# Patient Record
Sex: FEMALE | Race: WHITE | NOT HISPANIC OR LATINO | Employment: FULL TIME | URBAN - METROPOLITAN AREA
[De-identification: names, ages, dates, MRNs, and addresses within clinical notes are randomized per-mention and may not be internally consistent; named-entity substitution may affect disease eponyms.]

---

## 2017-01-25 ENCOUNTER — OFFICE VISIT (OUTPATIENT)
Dept: OBSTETRICS AND GYNECOLOGY | Facility: CLINIC | Age: 32
End: 2017-01-25

## 2017-01-25 VITALS
SYSTOLIC BLOOD PRESSURE: 118 MMHG | DIASTOLIC BLOOD PRESSURE: 70 MMHG | WEIGHT: 144 LBS | HEIGHT: 66 IN | BODY MASS INDEX: 23.14 KG/M2

## 2017-01-25 DIAGNOSIS — Z00.00 ANNUAL PHYSICAL EXAM: Primary | ICD-10-CM

## 2017-01-25 DIAGNOSIS — G47.00 INSOMNIA, UNSPECIFIED TYPE: ICD-10-CM

## 2017-01-25 PROBLEM — F41.9 ANXIETY: Status: ACTIVE | Noted: 2017-01-25

## 2017-01-25 PROCEDURE — 99395 PREV VISIT EST AGE 18-39: CPT | Performed by: OBSTETRICS & GYNECOLOGY

## 2017-01-25 NOTE — MR AVS SNAPSHOT
Jocy Kerline   1/25/2017 9:30 AM   Office Visit    Dept Phone:  [phone]   Encounter #:  [id number]    Provider:  Campbell Mahajan MD   Department:  [facility]                Your Full Care Plan              Today's Medication Changes          These changes are accurate as of: 1/25/17 10:52 AM.  If you have any questions, ask your nurse or doctor.               New Medication(s)Ordered:     Vortioxetine HBr 10 MG tablet   Commonly known as:  TRINTELLIX   Take 10 mg by mouth Daily for 30 days.   Started by:  Campbell Mahajan MD         Stop taking medication(s)listed here:     MULTIVITAMIN ADULT PO   Stopped by:  Campbell Mahajan MD                Where to Get Your Medications      These medications were sent to [address] AT [facility] [address] - [phone]  - [phone] [facility] [address] [facility] [address]     Phone:  [phone]     Vortioxetine HBr 10 MG tablet                  Your Updated Medication List          This list is accurate as of: 1/25/17 10:52 AM.  Always use your most recent med list.                FLUoxetine 20 MG capsule   Commonly known as:  PROZAC   Take 1 capsule by mouth Daily.       prenatal vitamin 27-0.8 27-0.8 MG tablet tablet       Vortioxetine HBr 10 MG tablet   Commonly known as:  TRINTELLIX   Take 10 mg by mouth Daily for 30 days.               You Were Diagnosed With        Codes Comments    Annual physical exam    -  Primary ICD-10-CM: Z00.00  ICD-9-CM: V70.0     Insomnia, unspecified type     ICD-10-CM: G47.00  ICD-9-CM: 780.52       Instructions     None    Patient Instructions History      Upcoming Appointments     Visit Type Date Time Department    ANNUAL 1/25/2017  9:30 AM [facility]      MyChart Signup     [facility] allows you to send messages to your doctor, view your test results, renew your prescriptions, schedule  "appointments, and more. To sign up, go to [email or web address] and click on the Sign Up Now link in the New User? box. Enter your Aidin Activation Code exactly as it appears below along with the last four digits of your Social Security Number and your Date of Birth () to complete the sign-up process. If you do not sign up before the expiration date, you must request a new code.    Aidin Activation Code: [email or web address]  Expires: 2017 10:52 AM    If you have questions, you can email [email or web address] or call [phone] to talk to our Aidin staff. Remember, Aidin is NOT to be used for urgent needs. For medical emergencies, dial 911.               Other Info from Your Visit           Allergies     Penicillins        Reason for Visit     Annual Exam           Vital Signs     Blood Pressure Height Weight Last Menstrual Period Body Mass Index Smoking Status    118/70 66\" (167.6 cm) 144 lb (65.3 kg) 2017 23.24 kg/m2 Never Smoker      Problems and Diagnoses Noted     Anxiety problem    Annual physical exam    -  Primary    Difficulty falling or staying asleep            "

## 2017-01-25 NOTE — PROGRESS NOTES
"Subjective   Chief Complaint   Patient presents with   • Annual Exam     Jocy Churchill is a 31 y.o. year old  presenting to be seen for her annual exam.    Current birth control method: none./rhythm.  She and her  who is 14 years older have not been really using anything for birth control.  She is under a lot of stress.  Family and work he has 9 children not all of whom they care for.  Her discussion regarding her exhalation is one that she is very stressed with not a lot of time and/or finances.  Doesn't sleep well associated with the above.  They also have recently had to pay $350 a month in child support that they don't really have.  It sounds like he is a  for a couple in [place].  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  She is on Prozac since last year or before when Dr. Donovan was in practice.  Doesn't sound like it's working very well also need to consider change.    Patient's last menstrual period was 2017.    She is sexually active.   Condoms are not typically used.  Decreased libido.    Past 6 month menstrual history:    Cycle Frequency: regular, predictable and consistent every 28 - 32 days   Menstrual cycle character: flow is normal   Cycle Duration: 5 - 6   Number of heavy days of flows: 0   Intermenstrual bleeding present: {no   Post-coital bleeding present: no     She exercises regularly: yes.1 child and 9 step children  Calcium intake: yes.  She performs self breast exam:no.  She has concerns about domestic violence: no.    The following portions of the patient's history were reviewed and updated as appropriate:problem list, current medications, allergies, past family history, past medical history, past social history and past surgical history.    Review of Systems increased stress - does meditation -helps      Objective   Visit Vitals   • /70   • Ht 66\" (167.6 cm)   • Wt 144 lb (65.3 kg)   • LMP 2017  Comment: 28 cycle " "days   • BMI 23.24 kg/m2       General:  well developed; well nourished  no acute distress   Skin:  No suspicious lesions seen   Thyroid: not examined   Breasts:   her breasts are symmetrical.  There are no masses with exception of the right axillary quadrant where there is about a 1+ centimeter mobile mass that she reports is unchanged since an ultrasound in 2015    Abdomen: soft, non-tender; no masses  no umbilical or inginual hernias are present  no hepato-splenomegaly   Pelvis: Clinical staff was present for exam  External genitalia:  normal appearance of the external genitalia including Bartholin's and [place]'s glands.  :  urethral meatus normal; urethral hypermobility is absent.  Vaginal:  normal pink mucosa without prolapse or lesions.  Cervix:  normal appearance. Pap done  Uterus:  normal size, shape and consistency.  Adnexa:  normal bimanual exam of the adnexa.  Rectal:  anus visually normal appearing.     Lab Review   No data reviewed    Imaging  Mammogram report       Assessment   1. Normal gynecologic examination patient is unsure whether she wants to have children or not.  Currently on prenatal vitamins and not using birth control other than the rhythm method.  2. Stress and anxiety associated with one child and 9 step children not all of whom they see every  weekend.  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 I suggested with her current stress stomach control with Prozac that we need to consider something different.  Her mother is on Celexa and she says her brother and father need to be on something.  We'll try TRintellix 10 mg.  3. She has insomnia.  Possibly due to stress and not being able to \"turnoff her mind \"     Plan   1 Trintellix 10 mg with card to cause number $10 per month     Continue prenatal vitamins and rhythm method for now discourage pregnancy until we get her stress under control     Increase exercise and calcium may help as well    Medications Rx this encounter:  No " orders of the defined types were placed in this encounter.         This note was electronically signed.    Campbell Mahajan MD  1/25/2017

## 2017-01-27 ENCOUNTER — TELEPHONE (OUTPATIENT)
Dept: OBSTETRICS AND GYNECOLOGY | Facility: CLINIC | Age: 32
End: 2017-01-27

## 2017-01-27 NOTE — TELEPHONE ENCOUNTER
----- Message from Jack Posey sent at 1/27/2017  8:53 AM EST -----  Contact: [phone]  PT RETURNED CALL    @9:34 left detailed message to see if patient has tried using a saving card for Trintellix. Her insurance will not cover Trintellix 10mg. Patient has tried and failed Prozac.    [phone] Patient called back and states she has not tried using the savings card. I advised patient to try using the savings card first and call back if she is still unable to afford it.

## 2017-03-17 ENCOUNTER — TELEPHONE (OUTPATIENT)
Dept: OBSTETRICS AND GYNECOLOGY | Facility: CLINIC | Age: 32
End: 2017-03-17

## 2017-03-17 RX ORDER — METRONIDAZOLE 500 MG/1
500 TABLET ORAL 2 TIMES DAILY
Qty: 10 TABLET | Refills: 0 | Status: SHIPPED | OUTPATIENT
Start: 2017-03-17 | End: 2017-03-22

## 2017-03-17 NOTE — TELEPHONE ENCOUNTER
Pt states she thinks that she has a BV infection. C/o vaginal odor with increase d/c. She states she changed soaps a couple days ago and she has had this happen before when she changes soaps. Would like to know if something could be called in for her?         [facility] in [place], [place]

## 2017-03-17 NOTE — TELEPHONE ENCOUNTER
It's doubtful that she gets BV by changing soap.  That sounds more of an allergic reaction.  However if she has a history of BV and has an odor I will e prescribe her a few days of Flagyl (done)

## 2017-03-17 NOTE — TELEPHONE ENCOUNTER
----- Message from Joan Rawls sent at 3/17/2017 10:02 AM EDT -----  Contact: [phone]  Called and ask for Sergei would like for her to please call her

## 2017-04-17 NOTE — TELEPHONE ENCOUNTER
----- Message from Joan Rawls sent at 4/17/2017 11:46 AM EDT -----  Pt needs nurse to call b/c meds trinelox is making her sick, wants something else prescribed.

## 2017-11-17 ENCOUNTER — TELEPHONE (OUTPATIENT)
Dept: OBSTETRICS AND GYNECOLOGY | Facility: CLINIC | Age: 32
End: 2017-11-17

## 2017-11-17 NOTE — TELEPHONE ENCOUNTER
I called Jocy to let her know that she would need to come in for an appointment before Flagyl could be prescribed.  No answer, but I left a voice mail asking her to return my call.

## 2017-11-20 RX ORDER — FLUCONAZOLE 150 MG/1
150 TABLET ORAL ONCE
Qty: 1 TABLET | Refills: 0 | Status: SHIPPED | OUTPATIENT
Start: 2017-11-20 | End: 2017-11-20

## 2017-11-20 NOTE — TELEPHONE ENCOUNTER
I called Jocy back today and her symptoms are consistent with a yeast infection, rather than BV.  External irritation and itching, very little discharge.  She has used OTC treatment for yeast with very little improvement.  I will send in one dose of Diflucan 150 mg to be taken orally.  She will also use 1% hydrocortisone cream externally to reduce inflammation.  She will call back if symptoms persist.

## 2018-02-12 ENCOUNTER — OFFICE VISIT (OUTPATIENT)
Dept: OBSTETRICS AND GYNECOLOGY | Facility: CLINIC | Age: 33
End: 2018-02-12

## 2018-02-12 VITALS
WEIGHT: 150 LBS | SYSTOLIC BLOOD PRESSURE: 114 MMHG | BODY MASS INDEX: 34.71 KG/M2 | DIASTOLIC BLOOD PRESSURE: 70 MMHG | HEIGHT: 55 IN

## 2018-02-12 DIAGNOSIS — N94.3 PMS (PREMENSTRUAL SYNDROME): ICD-10-CM

## 2018-02-12 DIAGNOSIS — Z01.419 WOMEN'S ANNUAL ROUTINE GYNECOLOGICAL EXAMINATION: Primary | ICD-10-CM

## 2018-02-12 PROCEDURE — 99395 PREV VISIT EST AGE 18-39: CPT | Performed by: OBSTETRICS & GYNECOLOGY

## 2018-02-12 RX ORDER — CITALOPRAM 20 MG/1
20 TABLET ORAL DAILY
Qty: 30 TABLET | Refills: 11 | Status: SHIPPED | OUTPATIENT
Start: 2018-02-12 | End: 2018-09-21 | Stop reason: SDUPTHER

## 2018-02-12 NOTE — PROGRESS NOTES
"Subjective   Chief Complaint   Patient presents with   • Annual Exam     Jocy Churchill is a 33 y.o. year old  presenting to be seen for her annual exam.    Current birth control method: none and rhythm method. She got nauseated and vomited 2 hours after Trintellix so she stopped. Her symptoms are worse PMS.  Patient's last menstrual period was 2018. 1 yeast infection last year and Rx helped. Hx cramps - uses heating pad and Tylenol    She is sexually active.   Condoms are not typically used.      Past 6 month menstrual history:    Cycle Frequency: regular, predictable and consistent every 28 - 32 days   Menstrual cycle character: flow is typically normal   Cycle Duration: 6 - 7   Number of heavy days of flows: 1 sticky clot for 1 day    Intermenstrual bleeding present: {no   Post-coital bleeding present: no     She exercises regularly: yes.  Calcium intake: 2 milk a day   She performs self breast exam:no.  She has concerns about domestic violence: no.    The following portions of the patient's history were reviewed and updated as appropriate:problem list, current medications, allergies, past family history, past medical history, past social history and past surgical history.    Review of Systems    normal bladder and bowels  Objective   /70  Ht 65.5 cm (25.79\")  Wt 68 kg (150 lb)  LMP 2018 Comment: 28 cycle days  .6 kg/m2     General:  well developed; well nourished  no acute distress  appears stated age   Skin:  No suspicious lesions seen   Thyroid:    Breasts:  Examined in supine position  Symmetric without masses or skin dimpling  Nipples normal without inversion, lesions or discharge  There are no palpable axillary nodes  Fibrocystic changes are present both breasts without a discrete mass   Abdomen: soft, non-tender; no masses  no umbilical or inginual hernias are present  no hepato-splenomegaly   Pelvis: Clinical staff was present for exam  External genitalia:  normal " appearance of the external genitalia including Bartholin's and [place]'s glands. erythema externallly bllat  :  urethral meatus normal;  Uterus:  normal size, shape and consistency.  Adnexa:  normal bimanual exam of the adnexa.  Rectal:  digital rectal exam not performed; anus visually normal appearing.     Lab Review   Last Pap test    Imaging  No data reviewed       Assessment   1. PMS we discussed options she would like to try Celexa.  Hopefully will not decrease libido  2. Pap is up-to-date  3. She called in November and got Diflucan ×1.  Not sure whether that was allergic or not.  Currently some erythema externally and minimal discharge.     Plan   1. 1000 mg calcium in divided doses ideally in diet; regular exercise  2. We'll add Celexa.  She's got adequate calcium not a lot of caffeine ( one coffee may be one small  Coke)  3. Pap at returned office      Medications Rx this encounter:  New Medications Ordered This Visit   Medications   • citalopram (CELEXA) 20 MG tablet     Sig: Take 1 tablet by mouth Daily.     Dispense:  30 tablet     Refill:  11          This note was electronically signed.    Campbell Mahajan MD  2/12/2018

## 2018-02-16 ENCOUNTER — TELEPHONE (OUTPATIENT)
Dept: OBSTETRICS AND GYNECOLOGY | Facility: CLINIC | Age: 33
End: 2018-02-16

## 2018-02-16 NOTE — TELEPHONE ENCOUNTER
Provider Name: Keyshawn  Reason for Call:Still has irritation wants something called in  Pharmacy Name: [facility]  Call Back Number:[phone]

## 2018-07-25 ENCOUNTER — TELEPHONE (OUTPATIENT)
Dept: OBSTETRICS AND GYNECOLOGY | Facility: CLINIC | Age: 33
End: 2018-07-25

## 2018-07-25 NOTE — TELEPHONE ENCOUNTER
Dr Mahajan Pt    Pt has Sx of a vaginal odor and a slight discharge.  Pt has had these Sx in the past and states that Flagyl worked well.  She would like a Rx for Flagyl sent to [facility] in [place].    Callback [phone]    [place]

## 2018-07-26 RX ORDER — METRONIDAZOLE 500 MG/1
500 TABLET ORAL 2 TIMES DAILY
Qty: 14 TABLET | Refills: 0 | Status: SHIPPED | OUTPATIENT
Start: 2018-07-26 | End: 2018-08-02

## 2018-09-21 ENCOUNTER — TELEPHONE (OUTPATIENT)
Dept: OBSTETRICS AND GYNECOLOGY | Facility: CLINIC | Age: 33
End: 2018-09-21

## 2018-09-21 RX ORDER — CITALOPRAM 40 MG/1
40 TABLET ORAL DAILY
Qty: 30 TABLET | Refills: 4 | Status: SHIPPED | OUTPATIENT
Start: 2018-09-21 | End: 2019-04-05 | Stop reason: SDUPTHER

## 2018-09-21 NOTE — TELEPHONE ENCOUNTER
JEREMY    PT SAYS HER CELEXA 25MG MEDS ARE WORKING BUT SHE FEELS LIKE SHE NEEDS A STRONGER DOSE.     [facility] IN [place], [place]

## 2018-09-21 NOTE — TELEPHONE ENCOUNTER
I increase the dosage to 40 mg a day.  If that's not effective I'll need to see her before her appointment in February thanks

## 2018-09-21 NOTE — TELEPHONE ENCOUNTER
[phone] left detailed message advising patient that Dr. Mahajan has increased her medication and if it does not help she will need an appointment before February.

## 2019-01-09 ENCOUNTER — TELEPHONE (OUTPATIENT)
Dept: OBSTETRICS AND GYNECOLOGY | Facility: CLINIC | Age: 34
End: 2019-01-09

## 2019-01-09 RX ORDER — FLUCONAZOLE 150 MG/1
150 TABLET ORAL DAILY
Qty: 1 TABLET | Refills: 0 | Status: SHIPPED | OUTPATIENT
Start: 2019-01-09

## 2019-01-09 NOTE — TELEPHONE ENCOUNTER
PT IS CALLING HAS POSSIBLE UT - WAS GETTING BETTER  WANTING TO HAVE DR LUNA CALL IN FLAGYL -IT IS NOW A YEAST INFECTION NO UTI   PLEASE CALL INTO [facility]

## 2019-01-11 ENCOUNTER — TELEPHONE (OUTPATIENT)
Dept: OBSTETRICS AND GYNECOLOGY | Facility: CLINIC | Age: 34
End: 2019-01-11

## 2019-01-11 NOTE — TELEPHONE ENCOUNTER
Pt is calling she is stating that the diflucan worked however she is still having some irritation and was told to call back  Please call her  [phone]

## 2019-04-05 RX ORDER — CITALOPRAM 40 MG/1
TABLET ORAL
Qty: 30 TABLET | Refills: 2 | Status: SHIPPED | OUTPATIENT
Start: 2019-04-05